# Patient Record
(demographics unavailable — no encounter records)

---

## 2025-01-31 NOTE — HISTORY OF PRESENT ILLNESS
[N] : Patient denies prior pregnancies [Menarche Age: ____] : age at menarche was [unfilled] [Intravaginal Ring] : uses the intravaginal ring [Y] : Patient is sexually active [No] : Patient does not have concerns regarding sex [Currently Active] : currently active [Men] : men [PapSmeardate] : 01/18/2023 [TextBox_31] : Negative  [GonorrheaDate] : 10/13/2023 [TextBox_63] : Negative [ChlamydiaDate] : 10/13/2023 [TextBox_68] : Negative  [LMPDate] : 01/09/25 [PGHxTotal] : 0 [FreeTextEntry1] : 01/09/25

## 2025-01-31 NOTE — PHYSICAL EXAM
[Chaperone Present] : A chaperone was present in the examining room during all aspects of the physical examination [28087] : A chaperone was present during the pelvic exam. [Appropriately responsive] : appropriately responsive [Alert] : alert [No Acute Distress] : no acute distress [Soft] : soft [Non-tender] : non-tender [Non-distended] : non-distended [No Mass] : no mass [Oriented x3] : oriented x3 [Examination Of The Breasts] : a normal appearance [No Masses] : no breast masses were palpable [Labia Majora] : normal [Labia Minora] : normal [No Bleeding] : There was no active vaginal bleeding [Normal] : normal [Uterine Adnexae] : non-palpable [FreeTextEntry2] : TIA Garces [FreeTextEntry6] : cluster of nevi on left breast; not new and followed by dermatology per pt.  [Tenderness] : nontender

## 2025-01-31 NOTE — PHYSICAL EXAM
[Chaperone Present] : A chaperone was present in the examining room during all aspects of the physical examination [40974] : A chaperone was present during the pelvic exam. [Appropriately responsive] : appropriately responsive [Alert] : alert [No Acute Distress] : no acute distress [Soft] : soft [Non-tender] : non-tender [Non-distended] : non-distended [No Mass] : no mass [Oriented x3] : oriented x3 [Examination Of The Breasts] : a normal appearance [No Masses] : no breast masses were palpable [Labia Majora] : normal [Labia Minora] : normal [No Bleeding] : There was no active vaginal bleeding [Normal] : normal [Uterine Adnexae] : non-palpable [FreeTextEntry2] : TIA Garces [FreeTextEntry6] : cluster of nevi on left breast; not new and followed by dermatology per pt.  [Tenderness] : nontender

## 2025-01-31 NOTE — PLAN
[FreeTextEntry1] : -F/u pap -Rx NuvaRing renewed with correct use and signs and symptoms of concern that necessitate calling the office and seeking emergent care, including abdominal pain, chest pain, headaches, blurry or changed vision, severe leg pain, swelling or redness reviewed -Consider Gardasil  -Recommended dermatology for routine, annual skin survey -Routine physical activity encouraged -RTO one year or sooner PRN for any new problems, questions or concerns